# Patient Record
Sex: FEMALE | Race: OTHER | Employment: OTHER | URBAN - METROPOLITAN AREA
[De-identification: names, ages, dates, MRNs, and addresses within clinical notes are randomized per-mention and may not be internally consistent; named-entity substitution may affect disease eponyms.]

---

## 2017-12-01 NOTE — PATIENT DISCUSSION
[employer]: Pt edu with [employer] justine OS, OD -0.50 may need glasses for near activities or can make second eye intentionally slightly myopic to give better reading VA out of second eye. Pt edu on neuro adaption period.

## 2021-10-14 NOTE — PATIENT DISCUSSION
Discussed with patient to use a lubricant ointment at bedtime to assist with OD not opening well in the morning.

## 2021-11-15 NOTE — PATIENT DISCUSSION
Discussed with patient dry eye does causes VA fluctuation. Patient instructed to start PF tears OU 3-4x/day. Start warm compresses OU BID. Start lid scrubs OU BID.

## 2022-03-30 ENCOUNTER — NEW PATIENT (OUTPATIENT)
Dept: URBAN - METROPOLITAN AREA CLINIC 39 | Facility: CLINIC | Age: 67
End: 2022-03-30

## 2022-03-30 DIAGNOSIS — H52.11: ICD-10-CM

## 2022-03-30 DIAGNOSIS — H04.123: ICD-10-CM

## 2022-03-30 DIAGNOSIS — H43.813: ICD-10-CM

## 2022-03-30 DIAGNOSIS — H43.391: ICD-10-CM

## 2022-03-30 DIAGNOSIS — H52.203: ICD-10-CM

## 2022-03-30 DIAGNOSIS — H52.02: ICD-10-CM

## 2022-03-30 PROCEDURE — 92004 COMPRE OPH EXAM NEW PT 1/>: CPT

## 2022-03-30 PROCEDURE — 92015 DETERMINE REFRACTIVE STATE: CPT

## 2022-03-30 ASSESSMENT — VISUAL ACUITY
OD_SC: 20/30-2
OU_CC: 20/25+1
OD_CC: 20/25-2
OD_CC: J1
OS_CC: 20/25
OU_CC: J1
OS_CC: J1
OU_SC: 20/30-2
OD_SC: J1
OS_SC: 20/30-2

## 2022-03-30 ASSESSMENT — TONOMETRY
OS_IOP_MMHG: 14
OD_IOP_MMHG: 25
OS_IOP_MMHG: 17

## 2022-04-19 ENCOUNTER — CONSULTATION/EVALUATION (OUTPATIENT)
Dept: URBAN - METROPOLITAN AREA CLINIC 39 | Facility: CLINIC | Age: 67
End: 2022-04-19

## 2022-04-19 VITALS — SYSTOLIC BLOOD PRESSURE: 120 MMHG | DIASTOLIC BLOOD PRESSURE: 74 MMHG | HEART RATE: 72 BPM | HEIGHT: 55 IN

## 2022-04-19 DIAGNOSIS — H04.123: ICD-10-CM

## 2022-04-19 DIAGNOSIS — H35.373: ICD-10-CM

## 2022-04-19 DIAGNOSIS — H35.363: ICD-10-CM

## 2022-04-19 DIAGNOSIS — H44.23: ICD-10-CM

## 2022-04-19 DIAGNOSIS — H43.813: ICD-10-CM

## 2022-04-19 DIAGNOSIS — H43.393: ICD-10-CM

## 2022-04-19 PROCEDURE — 92201 OPSCPY EXTND RTA DRAW UNI/BI: CPT

## 2022-04-19 PROCEDURE — 99214 OFFICE O/P EST MOD 30 MIN: CPT

## 2022-04-19 PROCEDURE — 92134 CPTRZ OPH DX IMG PST SGM RTA: CPT

## 2022-04-19 ASSESSMENT — VISUAL ACUITY
OD_CC: J1
OD_CC: 20/25-2
OS_CC: 20/40+2
OS_CC: J1
OS_PH: 20/30+2

## 2022-04-19 ASSESSMENT — TONOMETRY
OD_IOP_MMHG: 15
OS_IOP_MMHG: 16

## 2022-04-19 NOTE — PATIENT DISCUSSION
Discussed risk of vision loss from secondary CNV, lacquer crack, myopic maculopathy, or retinal detachment.  No CNV ou present.

## 2022-04-19 NOTE — PATIENT DISCUSSION
Cautioned patient that vitreous detachment will be an ongoing process for a period of time. Just because no tears are seen today, that doesn’t mean that a tear couldn’t develop at any time during the process.  New or increased symptoms should be eval.

## 2022-04-19 NOTE — PATIENT DISCUSSION
Explained that surgical removal of floaters is not appropriate for most patients and only done for severe cases.  pt poor candidate for elective PPV due to h/o RK and myopic degen.

## 2022-04-19 NOTE — PATIENT DISCUSSION
Discussed risk of vision loss from secondary CNV, lacquer crack, myopic maculopathy, or retinal detachment.

## 2022-10-20 NOTE — PATIENT DISCUSSION
NOT VISUALLY SIGNIFICANT: Informed patient that their cataract is not visually significant or does not meet the criteria for cataract surgery. Recommend attention to cataract symptoms monitoring by regular examinations. Patient instructed to call with changes in vision. Detail Level: Simple Detail Level: Generalized Detail Level: Detailed

## 2023-04-03 ENCOUNTER — COMPREHENSIVE EXAM (OUTPATIENT)
Dept: URBAN - METROPOLITAN AREA CLINIC 40 | Facility: CLINIC | Age: 68
End: 2023-04-03

## 2023-04-03 DIAGNOSIS — H43.393: ICD-10-CM

## 2023-04-03 DIAGNOSIS — H35.373: ICD-10-CM

## 2023-04-03 DIAGNOSIS — H44.23: ICD-10-CM

## 2023-04-03 DIAGNOSIS — H04.123: ICD-10-CM

## 2023-04-03 DIAGNOSIS — H52.02: ICD-10-CM

## 2023-04-03 DIAGNOSIS — H43.813: ICD-10-CM

## 2023-04-03 DIAGNOSIS — H52.4: ICD-10-CM

## 2023-04-03 DIAGNOSIS — H35.363: ICD-10-CM

## 2023-04-03 DIAGNOSIS — H52.11: ICD-10-CM

## 2023-04-03 DIAGNOSIS — H52.203: ICD-10-CM

## 2023-04-03 PROCEDURE — 92014 COMPRE OPH EXAM EST PT 1/>: CPT

## 2023-04-03 PROCEDURE — 92015 DETERMINE REFRACTIVE STATE: CPT

## 2023-04-03 PROCEDURE — 92499OP2 OPTOMAP RETINAL SCREENING BOTH EYES

## 2023-04-03 ASSESSMENT — TONOMETRY
OS_IOP_MMHG: 17
OS_IOP_MMHG: 19
OD_IOP_MMHG: 18
OD_IOP_MMHG: 26

## 2023-04-03 ASSESSMENT — VISUAL ACUITY
OS_CC: 20/25-1
OD_CC: J1
OD_SC: 20/50-2
OU_SC: 20/25-2
OD_CC: 20/20-1
OU_SC: J1
OU_CC: 20/20-1
OS_SC: 20/25-2
OD_SC: J1
OS_CC: J1
OU_CC: J1
OS_SC: J3

## 2024-04-09 ENCOUNTER — COMPREHENSIVE EXAM (OUTPATIENT)
Dept: URBAN - METROPOLITAN AREA CLINIC 39 | Facility: CLINIC | Age: 69
End: 2024-04-09

## 2024-04-09 DIAGNOSIS — H35.373: ICD-10-CM

## 2024-04-09 DIAGNOSIS — H04.123: ICD-10-CM

## 2024-04-09 DIAGNOSIS — H52.11: ICD-10-CM

## 2024-04-09 DIAGNOSIS — H52.203: ICD-10-CM

## 2024-04-09 DIAGNOSIS — H43.813: ICD-10-CM

## 2024-04-09 DIAGNOSIS — H35.363: ICD-10-CM

## 2024-04-09 DIAGNOSIS — H43.393: ICD-10-CM

## 2024-04-09 DIAGNOSIS — H44.23: ICD-10-CM

## 2024-04-09 DIAGNOSIS — H52.4: ICD-10-CM

## 2024-04-09 DIAGNOSIS — H52.02: ICD-10-CM

## 2024-04-09 PROCEDURE — 92015 DETERMINE REFRACTIVE STATE: CPT

## 2024-04-09 PROCEDURE — 92014 COMPRE OPH EXAM EST PT 1/>: CPT

## 2024-04-09 ASSESSMENT — VISUAL ACUITY
OD_SC: 20/40-1
OS_SC: J8
OS_CC: J2
OD_CC: 20/20-1
OS_SC: 20/25-2
OD_CC: J1
OS_CC: 20/25-1
OD_SC: J1

## 2024-04-09 ASSESSMENT — TONOMETRY
OD_IOP_MMHG: 22
OS_IOP_MMHG: 20

## 2024-05-01 ENCOUNTER — CONTACT LENSES/GLASSES VISIT (OUTPATIENT)
Dept: URBAN - METROPOLITAN AREA CLINIC 39 | Facility: CLINIC | Age: 69
End: 2024-05-01

## 2024-05-01 DIAGNOSIS — H52.223: ICD-10-CM

## 2024-05-01 PROCEDURE — 92310-4 LEVEL 4 CONTACT LENS MANAGEMENT

## 2024-05-01 ASSESSMENT — VISUAL ACUITY
OD_CC: J2 CL
OD_CC: 20/40 CL
OS_CC: J5 CL
OU_CC: 20/20 CL
OS_CC: 20/25+1 CL

## 2024-10-16 NOTE — PATIENT DISCUSSION
Continue with sulfasalazine    The types of intraocular lenses were reviewed with the patient along with a discussion of their various strengths and weaknesses.

## 2025-05-06 ENCOUNTER — COMPREHENSIVE EXAM (OUTPATIENT)
Age: 70
End: 2025-05-06

## 2025-05-06 DIAGNOSIS — H44.23: ICD-10-CM

## 2025-05-06 DIAGNOSIS — H40.051: ICD-10-CM

## 2025-05-06 DIAGNOSIS — H52.11: ICD-10-CM

## 2025-05-06 DIAGNOSIS — H04.123: ICD-10-CM

## 2025-05-06 DIAGNOSIS — H43.393: ICD-10-CM

## 2025-05-06 DIAGNOSIS — H35.363: ICD-10-CM

## 2025-05-06 DIAGNOSIS — H52.02: ICD-10-CM

## 2025-05-06 DIAGNOSIS — H43.813: ICD-10-CM

## 2025-05-06 DIAGNOSIS — H52.4: ICD-10-CM

## 2025-05-06 DIAGNOSIS — H52.223: ICD-10-CM

## 2025-05-06 DIAGNOSIS — H35.373: ICD-10-CM

## 2025-05-06 DIAGNOSIS — G43.119: ICD-10-CM

## 2025-05-06 PROCEDURE — 92014 COMPRE OPH EXAM EST PT 1/>: CPT

## 2025-05-06 PROCEDURE — 92015 DETERMINE REFRACTIVE STATE: CPT
